# Patient Record
Sex: FEMALE | Race: WHITE | Employment: FULL TIME | URBAN - METROPOLITAN AREA
[De-identification: names, ages, dates, MRNs, and addresses within clinical notes are randomized per-mention and may not be internally consistent; named-entity substitution may affect disease eponyms.]

---

## 2020-10-31 ENCOUNTER — APPOINTMENT (OUTPATIENT)
Dept: GENERAL RADIOLOGY | Age: 45
End: 2020-10-31
Payer: COMMERCIAL

## 2020-10-31 ENCOUNTER — HOSPITAL ENCOUNTER (EMERGENCY)
Age: 45
Discharge: HOME | End: 2020-11-01
Attending: EMERGENCY MEDICINE
Payer: COMMERCIAL

## 2020-10-31 ENCOUNTER — APPOINTMENT (OUTPATIENT)
Dept: CT IMAGING | Age: 45
End: 2020-10-31
Payer: COMMERCIAL

## 2020-10-31 VITALS
TEMPERATURE: 98.4 F | RESPIRATION RATE: 18 BRPM | HEART RATE: 89 BPM | OXYGEN SATURATION: 99 % | SYSTOLIC BLOOD PRESSURE: 141 MMHG | WEIGHT: 155 LBS | HEIGHT: 62 IN | BODY MASS INDEX: 28.52 KG/M2 | DIASTOLIC BLOOD PRESSURE: 77 MMHG

## 2020-10-31 LAB
ANION GAP SERPL CALCULATED.3IONS-SCNC: 12 MMOL/L (ref 4–16)
BASOPHILS ABSOLUTE: 0.1 K/CU MM
BASOPHILS RELATIVE PERCENT: 0.8 % (ref 0–1)
BUN BLDV-MCNC: 11 MG/DL (ref 6–23)
CALCIUM SERPL-MCNC: 8.9 MG/DL (ref 8.3–10.6)
CHLORIDE BLD-SCNC: 105 MMOL/L (ref 99–110)
CO2: 24 MMOL/L (ref 21–32)
CREAT SERPL-MCNC: 0.7 MG/DL (ref 0.6–1.1)
D DIMER: 164 NG/ML(DDU)
DIFFERENTIAL TYPE: ABNORMAL
EOSINOPHILS ABSOLUTE: 0.3 K/CU MM
EOSINOPHILS RELATIVE PERCENT: 2.1 % (ref 0–3)
GFR AFRICAN AMERICAN: >60 ML/MIN/1.73M2
GFR NON-AFRICAN AMERICAN: >60 ML/MIN/1.73M2
GLUCOSE BLD-MCNC: 105 MG/DL (ref 70–99)
HCT VFR BLD CALC: 42.7 % (ref 37–47)
HEMOGLOBIN: 14.5 GM/DL (ref 12.5–16)
IMMATURE NEUTROPHIL %: 0.4 % (ref 0–0.43)
LIPASE: 21 IU/L (ref 13–60)
LYMPHOCYTES ABSOLUTE: 4.6 K/CU MM
LYMPHOCYTES RELATIVE PERCENT: 35.3 % (ref 24–44)
MAGNESIUM: 2.1 MG/DL (ref 1.8–2.4)
MCH RBC QN AUTO: 31.9 PG (ref 27–31)
MCHC RBC AUTO-ENTMCNC: 34 % (ref 32–36)
MCV RBC AUTO: 93.8 FL (ref 78–100)
MONOCYTES ABSOLUTE: 0.9 K/CU MM
MONOCYTES RELATIVE PERCENT: 6.5 % (ref 0–4)
NUCLEATED RBC %: 0 %
PDW BLD-RTO: 12.6 % (ref 11.7–14.9)
PLATELET # BLD: 317 K/CU MM (ref 140–440)
PMV BLD AUTO: 10 FL (ref 7.5–11.1)
POTASSIUM SERPL-SCNC: 3.7 MMOL/L (ref 3.5–5.1)
RBC # BLD: 4.55 M/CU MM (ref 4.2–5.4)
SEGMENTED NEUTROPHILS ABSOLUTE COUNT: 7.2 K/CU MM
SEGMENTED NEUTROPHILS RELATIVE PERCENT: 54.9 % (ref 36–66)
SODIUM BLD-SCNC: 141 MMOL/L (ref 135–145)
TOTAL IMMATURE NEUTOROPHIL: 0.05 K/CU MM
TOTAL NUCLEATED RBC: 0 K/CU MM
WBC # BLD: 13.1 K/CU MM (ref 4–10.5)

## 2020-10-31 PROCEDURE — 71045 X-RAY EXAM CHEST 1 VIEW: CPT

## 2020-10-31 PROCEDURE — 84484 ASSAY OF TROPONIN QUANT: CPT

## 2020-10-31 PROCEDURE — 93005 ELECTROCARDIOGRAM TRACING: CPT | Performed by: EMERGENCY MEDICINE

## 2020-10-31 PROCEDURE — 85379 FIBRIN DEGRADATION QUANT: CPT

## 2020-10-31 PROCEDURE — 80048 BASIC METABOLIC PNL TOTAL CA: CPT

## 2020-10-31 PROCEDURE — 84703 CHORIONIC GONADOTROPIN ASSAY: CPT

## 2020-10-31 PROCEDURE — 83735 ASSAY OF MAGNESIUM: CPT

## 2020-10-31 PROCEDURE — 85025 COMPLETE CBC W/AUTO DIFF WBC: CPT

## 2020-10-31 PROCEDURE — 99284 EMERGENCY DEPT VISIT MOD MDM: CPT

## 2020-10-31 PROCEDURE — 83690 ASSAY OF LIPASE: CPT

## 2020-11-01 ENCOUNTER — APPOINTMENT (OUTPATIENT)
Dept: CT IMAGING | Age: 45
End: 2020-11-01
Payer: COMMERCIAL

## 2020-11-01 LAB
HCG QUALITATIVE: NEGATIVE
TROPONIN T: <0.01 NG/ML

## 2020-11-01 PROCEDURE — 70450 CT HEAD/BRAIN W/O DYE: CPT

## 2020-11-01 NOTE — ED PROVIDER NOTES
CHIEF COMPLAINT    Chief Complaint   Patient presents with    Numbness     left arm     Back Pain     HPI  Annel Stevens is a 41 y.o. female who presents to the ED with complaints of pain in the thoracic back, shortness of breath, and paresthesias to the left upper extremity. Patient was diagnosed as being COVID-19 positive on 10/19/2020. She has experienced some intermittent episodes of shortness of breath since that time but this has become worse over the last few days. Around 3 PM today she began to experience some pain in the midthoracic region of the back and subsequently began to experience some paresthesias in the left arm starting the shoulder and working its way into the hand involving all fingers. Cannot recall any inciting event to bring this about. Symptoms have been constant since that time and she admits that she has been feeling more anxious. The back pain seems to be positional in nature with no other alleviating or aggravating factors. The shortness of breath is constant and exacerbated with exertion. The paresthesias of the left upper extremity are constant. She did have some mild nausea associated with this but no vomiting. She has never experienced symptoms such as this in the past.  Patient is a current smoker but has no underlying cardiac or pulmonary disease that she is aware of. Denies fevers, chills, chest pain, history of DVT/PE, recent travel, recent surgery, personal history of cancer, hemoptysis, or exogenous estrogen use. REVIEW OF SYSTEMS  Constitutional: No fever, chills or recent illness. Eye: No visual changes  HENT: No earache or sore throat. Resp: Complains of shortness of breath  Cardio: No chest pain or palpitations. GI: No abdominal pain, nausea, vomiting, constipation or diarrhea. No melena. : No dysuria, urgency or frequency.   Endocrine: No heat intolerance, no cold intolerance, no polydipsia   Lymphatics: No adenopathy  Musculoskeletal: No new muscle aches or joint pain. Neuro: No headaches. Complains of paresthesias to left upper extremity  Psych: No homicidal or suicidal thoughts  Skin: No rash, No itching. ?  ? PAST MEDICAL HISTORY  History reviewed. No pertinent past medical history. FAMILY HISTORY  History reviewed. No pertinent family history. SOCIAL HISTORY  Social History     Socioeconomic History    Marital status:      Spouse name: None    Number of children: None    Years of education: None    Highest education level: None   Occupational History    None   Social Needs    Financial resource strain: None    Food insecurity     Worry: None     Inability: None    Transportation needs     Medical: None     Non-medical: None   Tobacco Use    Smoking status: Current Every Day Smoker     Packs/day: 1.00   Substance and Sexual Activity    Alcohol use: Yes     Comment: few times a week     Drug use: Never    Sexual activity: None   Lifestyle    Physical activity     Days per week: None     Minutes per session: None    Stress: None   Relationships    Social connections     Talks on phone: None     Gets together: None     Attends Jain service: None     Active member of club or organization: None     Attends meetings of clubs or organizations: None     Relationship status: None    Intimate partner violence     Fear of current or ex partner: None     Emotionally abused: None     Physically abused: None     Forced sexual activity: None   Other Topics Concern    None   Social History Narrative    None       SURGICAL HISTORY  History reviewed. No pertinent surgical history.   CURRENT MEDICATIONS  Previous Medications    No medications on file     ALLERGIES  No Known Allergies  PHYSICAL EXAM  VITAL SIGNS:   ED Triage Vitals   Enc Vitals Group      BP 10/31/20 2208 (!) 141/77      Pulse 10/31/20 2208 89      Resp 10/31/20 2208 18      Temp 10/31/20 2208 98.4 °F (36.9 °C)      Temp Source 10/31/20 2208 Oral      SpO2 10/31/20 2208 99 % Weight 10/31/20 2210 155 lb (70.3 kg)      Height 10/31/20 2210 5' 2\" (1.575 m)      Head Circumference --       Peak Flow --       Pain Score --       Pain Loc --       Pain Edu? --       Excl. in [address]? --      Constitutional: Well developed, Well nourished, nontoxic appearing  HENT: Normocephalic, Atraumatic, Bilateral external ears normal, Oropharynx moist, No oral exudates, Nose normal.   Eyes: PERRL, EOMI, Conjunctiva normal, No discharge. No scleral icterus. Neck: Normal range of motion, No tenderness, Supple. Lymphatic: No lymphadenopathy noted. Cardiovascular: Normal heart rate, Normal rhythm, No murmurs, gallops or rubs. Thorax & Lungs: Normal breath sounds, No respiratory distress, No wheezing. Abdomen: Soft, No tenderness, No masses, No pulsatile masses, No distention, Normal bowel sounds  Skin: Warm, Dry, Pink, No mottling, No erythema, No rash. Back: No tenderness, No CVA tenderness. Extremities: No edema, No tenderness, No cyanosis, Normal perfusion, No clubbing. Musculoskeletal: Good range of motion in all major joints as observed. No major deformities noted. Neurologic: Alert & oriented x 3, Normal motor function, Normal sensory function, CN II-XII grossly intact as tested, No focal deficits noted. Psychiatric: Affect normal, Judgment normal, Mood normal.   EKG  Per my interpretation demonstrates normal sinus rhythm at a rate of 72 bpm.  Normal axis. Normal intervals. No acute ST segment changes. RADIOLOGY  Labs Reviewed   CBC WITH AUTO DIFFERENTIAL - Abnormal; Notable for the following components:       Result Value    WBC 13.1 (*)     MCH 31.9 (*)     Monocytes % 6.5 (*)     All other components within normal limits   BASIC METABOLIC PANEL - Abnormal; Notable for the following components:    Glucose 105 (*)     All other components within normal limits   D-DIMER, QUANTITATIVE   LIPASE   TROPONIN   HCG, SERUM, QUALITATIVE   MAGNESIUM     I personally reviewed the images.  The radiologist's interpretation reveals:  Last Imaging results   CT HEAD WO CONTRAST   Final Result   No acute intracranial abnormality. XR CHEST PORTABLE   Final Result   Airway thickening suggests inflammation, such as that seen with asthma,   bronchitis or smoking. No consolidative airspace disease. Procedures  NA  MEDS GIVEN IN ED:  Medications - No data to display  [address] [facility]  52-year-old female presents the emergency department complaints of left upper extremity paresthesias, pain in the thoracic back, and shortness of breath. Initial vital signs are reassuring without evidence of fever, tachycardia, or hypoxia. Her neurological exam is nonfocal.  I am unable to elicit any abnormal sensation or motor findings to the left upper extremity. She has NIH score of 0. Her lungs are clear to auscultation bilaterally. At this time we will obtain CBC, BMP, EKG, troponin, D-dimer, chest x-ray, and CT of the head. CT of the head is without acute intracranial pathology. Chest x-ray is without acute cardiopulmonary process. EKG is without acute ischemic changes. Troponin is nonelevated. D-dimer is within normal limits. Pregnancy screen is negative. At this time patient has a HEART score of 1 and given her reassuring evaluation here I feels appropriate for discharge home. Return precautions provided. Appropriate PPE utilized as indicated for entire patient encounter? Time of Disposition: See timeline  ? New Prescriptions    No medications on file     FINAL IMPRESSION  1. Shortness of breath    2. Low back pain without sciatica, unspecified back pain laterality, unspecified chronicity    3. Paresthesia        Electronically signed by:  Caleb Penn DO, 11/1/2020         Caleb Penn DO  11/01/20 [id number]

## 2020-11-01 NOTE — ED TRIAGE NOTES
Pt reports not feeling right. States had COVID symptoms on the 13th and test was negative. Was retested on the 19th coming back positive. States she is getting over symptoms but is now feeling jittery, anxious, L arm numbness and discomfort to mid back.

## 2020-11-02 PROCEDURE — 93010 ELECTROCARDIOGRAM REPORT: CPT | Performed by: INTERNAL MEDICINE

## 2020-11-06 LAB
EKG ATRIAL RATE: 72 BPM
EKG DIAGNOSIS: NORMAL
EKG P AXIS: 47 DEGREES
EKG P-R INTERVAL: 168 MS
EKG Q-T INTERVAL: 394 MS
EKG QRS DURATION: 84 MS
EKG QTC CALCULATION (BAZETT): 431 MS
EKG R AXIS: 59 DEGREES
EKG T AXIS: 32 DEGREES
EKG VENTRICULAR RATE: 72 BPM

## 2024-01-10 ENCOUNTER — OFFICE VISIT (OUTPATIENT)
Dept: FAMILY MEDICINE CLINIC | Age: 49
End: 2024-01-10
Payer: COMMERCIAL

## 2024-01-10 VITALS
SYSTOLIC BLOOD PRESSURE: 136 MMHG | DIASTOLIC BLOOD PRESSURE: 84 MMHG | OXYGEN SATURATION: 97 % | WEIGHT: 181 LBS | TEMPERATURE: 98.1 F | HEART RATE: 97 BPM | HEIGHT: 62 IN | BODY MASS INDEX: 33.31 KG/M2

## 2024-01-10 DIAGNOSIS — J02.9 ACUTE PHARYNGITIS, UNSPECIFIED ETIOLOGY: Primary | ICD-10-CM

## 2024-01-10 LAB — S PYO AG THROAT QL: NORMAL

## 2024-01-10 PROCEDURE — 99203 OFFICE O/P NEW LOW 30 MIN: CPT | Performed by: NURSE PRACTITIONER

## 2024-01-10 PROCEDURE — 87880 STREP A ASSAY W/OPTIC: CPT | Performed by: NURSE PRACTITIONER

## 2024-01-10 RX ORDER — METHYLPREDNISOLONE 4 MG/1
TABLET ORAL
Qty: 1 KIT | Refills: 0 | Status: SHIPPED | OUTPATIENT
Start: 2024-01-10 | End: 2024-01-16

## 2024-01-10 ASSESSMENT — ENCOUNTER SYMPTOMS
ABDOMINAL PAIN: 0
SINUS PAIN: 0
SINUS PRESSURE: 0
VOMITING: 0
SHORTNESS OF BREATH: 0
COUGH: 1
CHANGE IN BOWEL HABIT: 0
DIARRHEA: 0
SORE THROAT: 1
SWOLLEN GLANDS: 0
WHEEZING: 0
RHINORRHEA: 1
CHEST TIGHTNESS: 0
NAUSEA: 0
VISUAL CHANGE: 0

## 2024-01-10 NOTE — PROGRESS NOTES
this visit.        Past medical, family,surgical history reviewed today.     Objective:  /84   Pulse 97   Temp 98.1 °F (36.7 °C) (Infrared)   Ht 1.575 m (5' 2\")   Wt 82.1 kg (181 lb)   SpO2 97%   BMI 33.11 kg/m²   BP Readings from Last 3 Encounters:   01/10/24 136/84   10/31/20 (!) 141/77     Wt Readings from Last 3 Encounters:   01/10/24 82.1 kg (181 lb)   10/31/20 70.3 kg (155 lb)         Physical Exam  Constitutional:       Appearance: Normal appearance.   HENT:      Head: Normocephalic.      Right Ear: Tympanic membrane, ear canal and external ear normal.      Left Ear: Tympanic membrane, ear canal and external ear normal.      Nose: Nose normal.      Mouth/Throat:      Lips: Pink.      Mouth: Mucous membranes are moist.      Pharynx: Posterior oropharyngeal erythema present.   Cardiovascular:      Rate and Rhythm: Normal rate and regular rhythm.      Pulses: Normal pulses.      Heart sounds: Normal heart sounds.   Pulmonary:      Breath sounds: Normal breath sounds.   Musculoskeletal:      Cervical back: Neck supple.   Skin:     General: Skin is warm and dry.   Neurological:      Mental Status: She is alert and oriented to person, place, and time.   Psychiatric:         Mood and Affect: Mood normal.         Behavior: Behavior normal.         Lab Results   Component Value Date    WBC 13.1 (H) 10/31/2020    HGB 14.5 10/31/2020    HCT 42.7 10/31/2020    MCV 93.8 10/31/2020     10/31/2020     Lab Results   Component Value Date     10/31/2020    K 3.7 10/31/2020     10/31/2020    CO2 24 10/31/2020    BUN 11 10/31/2020    CREATININE 0.7 10/31/2020    GLUCOSE 105 (H) 10/31/2020    CALCIUM 8.9 10/31/2020    LABGLOM >60 10/31/2020    GFRAA >60 10/31/2020     No results found for: \"CHOL\"  No results found for: \"TRIG\"  No results found for: \"HDL\"  No results found for: \"LDLCALC\", \"LDLCHOLESTEROL\"  No results found for: \"LABA1C\"  No results found for: \"TSHFT4\", \"TSH\",

## 2024-01-16 ENCOUNTER — TELEPHONE (OUTPATIENT)
Dept: FAMILY MEDICINE CLINIC | Age: 49
End: 2024-01-16

## 2024-01-16 RX ORDER — AZITHROMYCIN 250 MG/1
TABLET, FILM COATED ORAL
Qty: 1 PACKET | Refills: 0 | Status: SHIPPED | OUTPATIENT
Start: 2024-01-16

## 2024-01-16 NOTE — TELEPHONE ENCOUNTER
Pt called in stating she is not feeling well still, has been coughing up green mucus and she finished the medrol pack. Pt requesting ATB to be sent in. Please advise.

## 2024-03-13 ENCOUNTER — TELEPHONE (OUTPATIENT)
Dept: FAMILY MEDICINE CLINIC | Age: 49
End: 2024-03-13

## 2024-03-13 NOTE — TELEPHONE ENCOUNTER
Called patient back and left a message       ----- Message from Odalis Sheffield sent at 3/13/2024  3:29 PM EDT -----  Subject: Appointment Request    Reason for Call: New Patient/New to Provider Appointment needed: New   Patient Request Appointment    QUESTIONS    Reason for appointment request? No appointments available during search     Additional Information for Provider? pt wants to est at this location   ---------------------------------------------------------------------------  --------------  CALL BACK INFO  [phone]; OK to leave message on voicemail  ---------------------------------------------------------------------------  --------------  SCRIPT ANSWERS